# Patient Record
Sex: MALE | Race: BLACK OR AFRICAN AMERICAN | Employment: FULL TIME | URBAN - METROPOLITAN AREA
[De-identification: names, ages, dates, MRNs, and addresses within clinical notes are randomized per-mention and may not be internally consistent; named-entity substitution may affect disease eponyms.]

---

## 2018-11-04 ENCOUNTER — HOSPITAL ENCOUNTER (EMERGENCY)
Age: 43
Discharge: HOME | End: 2018-11-04
Attending: EMERGENCY MEDICINE
Payer: OTHER GOVERNMENT

## 2018-11-04 ENCOUNTER — APPOINTMENT (OUTPATIENT)
Dept: CT IMAGING | Age: 43
End: 2018-11-04
Attending: EMERGENCY MEDICINE
Payer: OTHER GOVERNMENT

## 2018-11-04 VITALS
DIASTOLIC BLOOD PRESSURE: 99 MMHG | WEIGHT: 250 LBS | TEMPERATURE: 98.3 F | RESPIRATION RATE: 12 BRPM | HEART RATE: 99 BPM | OXYGEN SATURATION: 100 % | SYSTOLIC BLOOD PRESSURE: 157 MMHG

## 2018-11-04 DIAGNOSIS — H57.12 PAIN OF LEFT EYE: Primary | ICD-10-CM

## 2018-11-04 DIAGNOSIS — R03.0 ELEVATED BLOOD PRESSURE READING: ICD-10-CM

## 2018-11-04 PROCEDURE — 70480 CT ORBIT/EAR/FOSSA W/O DYE: CPT

## 2018-11-04 PROCEDURE — 99284 EMERGENCY DEPT VISIT MOD MDM: CPT

## 2018-11-04 PROCEDURE — 74011000250 HC RX REV CODE- 250: Performed by: EMERGENCY MEDICINE

## 2018-11-04 PROCEDURE — 74011250637 HC RX REV CODE- 250/637: Performed by: EMERGENCY MEDICINE

## 2018-11-04 RX ORDER — ACETAMINOPHEN 500 MG
1000 TABLET ORAL ONCE
Status: COMPLETED | OUTPATIENT
Start: 2018-11-04 | End: 2018-11-04

## 2018-11-04 RX ORDER — CYCLOPENTOLATE HYDROCHLORIDE 10 MG/ML
1 SOLUTION/ DROPS OPHTHALMIC
Status: DISCONTINUED | OUTPATIENT
Start: 2018-11-04 | End: 2018-11-04

## 2018-11-04 RX ORDER — PROPARACAINE HYDROCHLORIDE 5 MG/ML
1 SOLUTION/ DROPS OPHTHALMIC
Status: COMPLETED | OUTPATIENT
Start: 2018-11-04 | End: 2018-11-04

## 2018-11-04 RX ORDER — ACETAMINOPHEN AND CODEINE PHOSPHATE 300; 30 MG/1; MG/1
1 TABLET ORAL
Status: COMPLETED | OUTPATIENT
Start: 2018-11-04 | End: 2018-11-04

## 2018-11-04 RX ORDER — ACETAMINOPHEN AND CODEINE PHOSPHATE 300; 30 MG/1; MG/1
1 TABLET ORAL
Qty: 8 TAB | Refills: 0 | Status: SHIPPED | OUTPATIENT
Start: 2018-11-04

## 2018-11-04 RX ADMIN — ACETAMINOPHEN 1000 MG: 500 TABLET ORAL at 07:23

## 2018-11-04 RX ADMIN — PROPARACAINE HYDROCHLORIDE 1 DROP: 5 SOLUTION/ DROPS OPHTHALMIC at 09:00

## 2018-11-04 RX ADMIN — FLUORESCEIN SODIUM 1 STRIP: 0.6 STRIP OPHTHALMIC at 09:00

## 2018-11-04 RX ADMIN — ACETAMINOPHEN AND CODEINE PHOSPHATE 1 TABLET: 300; 30 TABLET ORAL at 09:08

## 2018-11-04 NOTE — ED NOTES
6:54 AM :Pt care assumed from Dr. Mayur Dickerson, ED provider. Pt complaint(s), current treatment plan, progression and available diagnostic results have been discussed thoroughly. Rounding occurred: yes Intended Disposition: TBD Pending diagnostic reports and/or labs (please list): CT scan 
 
8:58 AM :CT shows no fracture or any obvious globe perforation. 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 FU with ophthalmology within 24 hours. Will control pain. Return if Sx worsen. Will give 1 dose Cyclogyl. 9:10 Am :Do not have Cyclogyl. Prefer not to prescribe. Will treat for pain and will see opthalmology tomorrow without fail. Scribe Attestation Justina Fairchild acting as a scribe for and in the presence of Ankush Richardson MD     
November 04, 2018 at 6:56 AM 
    
Provider Attestation:     
I personally performed the services described in the documentation, reviewed the documentation, as recorded by the scribe in my presence, and it accurately and completely records my words and actions.  November 04, 2018 at 6:56 AM - Ankush Richardson MD   
 
 

Amber Whitehead is a 41 y.o. male that was discharged in good condition. The patients diagnosis, condition and treatment were explained to  patient and aftercare instructions were given. The patient verbalized understanding. Patient armband removed and shredded.
Patient greeted / introduced myself as their primary nurse. Encouraged to voice any concerns, and all questions/concerns addressed. Assessment in progress. Explanation and teaching of all care given,  including any pending orders or procedures. Awaiting further MD orders at this time. Patient fall risk assessed, with prevention measures in place, to include bed in lowest position with casters locked and rail up, call bell within reach, lights on, pathway to bathroom free from obstacles. Patient instructed to call for assist OOB at all times. Floor dry. Slip resistant socks offered if needed.  Instructed that staff will make hourly rounds to provide reassessments in pain control, concerns, toileting, and any other updates in care. Hand hygiene maintained prior to and after patient/staff interaction.
Plan of care explained/understood; pt voiced his understanding. Awaiting rad tech
Pt states that he does not have sunglasses and requested an eye patch because he is sensitive to light.
Verbal and bedside report given to Cyndy Nunn RN. Pt to CT at this time; introduced to oncoming staff and plan of care explained/understood. No distress noted; pt remains awake/alert/oriented/conversant without difficulty.
1

## 2018-11-04 NOTE — DISCHARGE INSTRUCTIONS
Iritis: Care Instructions  Your Care Instructions    Iritis is an inflammation of the colored part of the eye. This part of the eye is called the iris. Iritis can cause redness and pain. It can make your eyes more sensitive to light. And it may make your pupils different sizes. Iritis is most often treated with prescription eyedrops. Treatment can usually prevent long-term problems with vision. Iritis usually lasts 6 to 8 weeks. You will need follow-up care with an eye doctor (ophthalmologist). Anterior uveitis (say \"you-vee-EYE-tus\") and iridocyclitis (say \"xqn-xy-tsp-amira-KLY-tus\") are other terms used to refer to this problem. Follow-up care is a key part of your treatment and safety. Be sure to make and go to all appointments, and call your doctor if you are having problems. It's also a good idea to know your test results and keep a list of the medicines you take. How can you care for yourself at home? · If the doctor gave you eyedrops, use them exactly as directed. Use the medicine for as long as instructed, even if your eye starts to look better soon. Call your doctor if you think you are having a problem with your eyedrops. Wash your hands well before and after you put in eyedrops. · To put in eyedrops or ointment:  ? Tilt your head back, and pull your lower eyelid down with one finger. ? Drop or squirt the medicine inside the lower lid. ? Close your eye for 30 to 60 seconds to let the drops or ointment move around. ? Do not touch the ointment or dropper tip to your eyelashes or any other surface. · Take an over-the-counter pain medicine, such as acetaminophen (Tylenol), ibuprofen (Advil, Motrin), or naproxen (Aleve). Read and follow all instructions on the label. · Make sure you go to all of your follow-up appointments. You will need a complete eye exam from an eye doctor. When should you call for help?   Call your doctor now or seek immediate medical care if:    · You have new or increasing eye pain.     · You have vision changes in either eye.    Watch closely for changes in your health, and be sure to contact your doctor if:    · You have new or worse symptoms.     · You do not get better as expected. Where can you learn more? Go to http://savanna-cilf.info/. Enter B112 in the search box to learn more about \"Iritis: Care Instructions. \"  Current as of: December 3, 2017  Content Version: 11.8  © 8956-5076 [employer], [employer]. Care instructions adapted under license by [employer] (which disclaims liability or warranty for this information). If you have questions about a medical condition or this instruction, always ask your healthcare professional. [address] any warranty or liability for your use of this information.
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
